# Patient Record
Sex: MALE | Race: WHITE | NOT HISPANIC OR LATINO | URBAN - METROPOLITAN AREA
[De-identification: names, ages, dates, MRNs, and addresses within clinical notes are randomized per-mention and may not be internally consistent; named-entity substitution may affect disease eponyms.]

---

## 2021-08-19 ENCOUNTER — OFFICE VISIT (OUTPATIENT)
Dept: URGENT CARE | Facility: URGENT CARE | Age: 36
End: 2021-08-19
Payer: COMMERCIAL

## 2021-08-19 VITALS
HEART RATE: 89 BPM | DIASTOLIC BLOOD PRESSURE: 81 MMHG | OXYGEN SATURATION: 99 % | WEIGHT: 224 LBS | SYSTOLIC BLOOD PRESSURE: 128 MMHG | TEMPERATURE: 98.8 F

## 2021-08-19 DIAGNOSIS — N34.2 URETHRITIS: ICD-10-CM

## 2021-08-19 DIAGNOSIS — R31.9 HEMATURIA, UNSPECIFIED TYPE: Primary | ICD-10-CM

## 2021-08-19 LAB
ALBUMIN UR-MCNC: 30 MG/DL
APPEARANCE UR: ABNORMAL
BACTERIA #/AREA URNS HPF: ABNORMAL /HPF
BILIRUB UR QL STRIP: NEGATIVE
COLOR UR AUTO: YELLOW
GLUCOSE UR STRIP-MCNC: NEGATIVE MG/DL
HGB UR QL STRIP: ABNORMAL
KETONES UR STRIP-MCNC: ABNORMAL MG/DL
LEUKOCYTE ESTERASE UR QL STRIP: ABNORMAL
NITRATE UR QL: NEGATIVE
PH UR STRIP: 6.5 [PH] (ref 5–7)
RBC #/AREA URNS AUTO: ABNORMAL /HPF
SP GR UR STRIP: 1.02 (ref 1–1.03)
UROBILINOGEN UR STRIP-ACNC: 1 E.U./DL
WBC #/AREA URNS AUTO: >100 /HPF
WBC CLUMPS #/AREA URNS HPF: PRESENT /HPF

## 2021-08-19 PROCEDURE — 87088 URINE BACTERIA CULTURE: CPT | Performed by: FAMILY MEDICINE

## 2021-08-19 PROCEDURE — 87086 URINE CULTURE/COLONY COUNT: CPT | Performed by: FAMILY MEDICINE

## 2021-08-19 PROCEDURE — 81001 URINALYSIS AUTO W/SCOPE: CPT | Performed by: FAMILY MEDICINE

## 2021-08-19 PROCEDURE — 99203 OFFICE O/P NEW LOW 30 MIN: CPT | Mod: 25 | Performed by: FAMILY MEDICINE

## 2021-08-19 PROCEDURE — 96372 THER/PROPH/DIAG INJ SC/IM: CPT | Performed by: FAMILY MEDICINE

## 2021-08-19 RX ORDER — AZITHROMYCIN 250 MG/1
250 TABLET, FILM COATED ORAL ONCE
Status: COMPLETED | OUTPATIENT
Start: 2021-08-19 | End: 2021-08-19

## 2021-08-19 RX ADMIN — AZITHROMYCIN 250 MG: 250 TABLET, FILM COATED ORAL at 20:16

## 2021-08-20 NOTE — PROGRESS NOTES
Chief complaint:   Blood in urine     Past couple of days has had some clear urethral discharge  Today some hematuria noted in urination  Concern for bladder infection   Dysuria/ugency/frequency 2 days  Fever chills: None  Nausea vomiting: None  Flank Pain: None    Problem list and histories reviewed & adjusted, as indicated.  Additional history: as documented    Problem list, Medication list, Allergies, and Medical/Social/Surgical histories reviewed in [facility] and updated as appropriate.    ROS:  Constitutional, HEENT, cardiovascular, pulmonary, gi and gu systems are negative, except as otherwise noted.    OBJECTIVE:                                                    /81   Pulse 89   Temp 98.8  F (37.1  C) (Tympanic)   Wt 101.6 kg (224 lb)   SpO2 99%   There is no height or weight on file to calculate BMI.  GENERAL: healthy, alert and no distress  Eyes: anicteric  Awake alert not in any acute cardiorespiratory distress  Psych: pleasant   Neurologic: No gross neurologic deficits  Skin: no obvious lesions or rashes  MS: no gross musculoskeletal defects noted, no edema    Diagnostic Test Results:  Results for orders placed or performed in visit on 08/19/21 (from the past 24 hour(s))   UA Macro with Reflex to Micro and Culture - lab collect    Specimen: Urine, Midstream   Result Value Ref Range    Color Urine Yellow Colorless, Straw, Light Yellow, Yellow    Appearance Urine Cloudy (A) Clear    Glucose Urine Negative Negative mg/dL    Bilirubin Urine Negative Negative    Ketones Urine Trace (A) Negative mg/dL    Specific Gravity Urine 1.020 1.003 - 1.035    Blood Urine Large (A) Negative    pH Urine 6.5 5.0 - 7.0    Protein Albumin Urine 30  (A) Negative mg/dL    Urobilinogen Urine 1.0 0.2, 1.0 E.U./dL    Nitrite Urine Negative Negative    Leukocyte Esterase Urine Large (A) Negative   Urine Microscopic Exam   Result Value Ref Range    Bacteria Urine Moderate (A) None Seen /HPF    RBC Urine  (A) 0-2 /HPF  /HPF    WBC Urine >100 (A) 0-5 /HPF /HPF    WBC Clumps Urine Present (A) None Seen /HPF        ASSESSMENT/PLAN:                                                        ICD-10-CM    1. Hematuria, unspecified type  R31.9 UA Macro with Reflex to Micro and Culture - lab collect     Urine Microscopic Exam     Urine Culture     cefTRIAXone (ROCEPHIN) injection 1 g     azithromycin (ZITHROMAX) tablet 250 mg   2. Urethritis  N34.2 cefTRIAXone (ROCEPHIN) injection 1 g     azithromycin (ZITHROMAX) tablet 250 mg     See AVS  Urethritis - patient initially declined STD testing. He and his partner regularly do STD screening.  We discussed that I suspect urethritis and cannot rule out coexisting uti as well.  Given Rocephin may treat both, empiric treatment  Done to cover both UTI and gonoccocal urethritis with one dose Rocephin 1 gram IM  zithromax to cover chlamydia as well.  Will send urine for urine culture.  He is declining STD testing at this time. However he will abstain from sexual intercourse until 7 days after treatment  And also plan to get retested, both him and his partner prior to resuming intercourse.  We discussed etiologies of hematuria  Patient with hematuria advised a repeat urinalysis in 6 weeks is needed to make sure hematuria is resolved. If persistent aware will need further evaluation to rule out possibility of stones or tumors. Patient states he will schedule an appointment with a primary care provider to discuss further and declined urology referral at this time.   Aware to go to ER or come in immediately if with any fever chills nausea vomiting or flank pain.  Adverse reactions of medications discussed.  Over the counter medications discussed.   Aware to come back in if with worsening symptoms or if no relief despite treatment plan  Patient voiced understanding and had no further questions.     MD Caroline Nina MD  [facility] [place]

## 2021-08-20 NOTE — PATIENT INSTRUCTIONS
Patient Education     Blood in the Urine    Blood in the urine (hematuria) has many possible causes. If it occurs after an injury (such as a car accident or fall), it is most often a sign of bruising to the kidney or bladder. Common causes of blood in the urine include urinary tract infections, kidney stones, inflammation, tumors, or certain other diseases of the kidney or bladder. Menstruation can cause blood to appear in the urine sample, but it is not coming from the urinary tract.   If only a tiny (trace) amount of blood is present, it will show up on the urine test, even though the urine may be yellow and not pink or red. This may occur with any of the above conditions, as well as heavy exercise or high fever. In this case, your healthcare provider may want to repeat the urine test on another day. This will show if there is still blood in the urine. If there is, then other tests can be done to find out the cause.   Home care  Follow these home care guidelines:    If your urine does not look bloody (pink, brown, or red) then you don't need to restrict your activity in any way.    If you can see blood in your urine, rest and don't do any strenuous activity until your next exam. Don't use aspirin, blood thinners, or anti-platelet or anti-inflammatory medicines. These include ibuprofen and naproxen. These thin the blood and may increase bleeding. Call your healthcare provider to talk about using these medicines.  Follow-up care  Follow up with your healthcare provider, or as advised. If you were injured and had blood in your urine, you should have a repeat urine test in 1 to 2 days. Contact your provider for this test.   A radiologist will review any X-rays that were taken. You will be told of any new findings that may affect your care.   When to seek medical advice  Call your healthcare provider right away if any of these occur:    Bright red blood or blood clots in the urine (if you did not have this  before)    Weakness, dizziness or fainting    New groin, belly, or back pain    Fever of 100.4 F (38 C) or higher, or as directed by your provider    Repeated vomiting    Bleeding from the nose or gums or easy bruising  [employer] last reviewed this educational content on 9/1/2019 2000-2021 [employer]. All rights reserved. This information is not intended as a substitute for professional medical care. Always follow your healthcare professional's instructions.           Patient Education     Urethritis in Men  The urethra is the tube that runs from the bladder through the penis. When the urethra is inflamed, it is called urethritis. The urethra becomes swollen and causes burning pain when you urinate. Other symptoms of urethritis may include itching or tingling of the penis, or pus discharge from the penis. You may also have pain with sex and masturbation. Some men may not have symptoms.      An inflamed urethra can cause pain during urination.   What causes urethritis?  Urethritis can be caused by a bacterial or viral infection. Such an infection can lead to conditions such as a urinary tract infection (UTI) or sexually transmitted infections (STIs). Urethritis can also be caused by injury or sensitivity or allergy to chemicals in lotions and other products.   How is urethritis diagnosed?  Your healthcare provider will examine you and ask about your symptoms and health history. You may also have one or more of the following tests:     Urine test. This is done to take urine samples and have them checked for problems.    Blood test. A blood sample is taken and checked for problems.    Urethral discharge culture. A fluid sample is taken from inside the urethra and checked under a microscope. To get the sample, a cotton swab is inserted into the opening of the penis and into the urethra.    Cystoscopy. This test lets the healthcare provider look for problems in the urinary tract. The test uses a thin,  flexible tube (cystoscope). It has a light and camera attached. The scope is inserted into the urethra.  How is urethritis treated?  Treatment depends on the cause of urethritis. If it s from a bacterial infection, medicines that fight infection (antibiotics) will be given. Your healthcare provider can tell you more about your treatment options. In the meantime, your symptoms can be treated. To ease pain and swelling, anti-inflammatory medicines, such as ibuprofen, may be given. If not treated, symptoms may get worse. Also, scar tissue can form in the urethra, causing it to narrow.   When to call your healthcare provider   Call your healthcare provider right away if you have any of the following:     Fever of  100.4  F ( 38 C ) or higher, or as directed by your provider    Blood in the urine or semen    Burning pain with urination    Increased urge to urinate    Discharge from the penis    Itching, soreness, or swelling in the penis or groin    Pain during sex or masturbation    Inability to urinate  Preventing STIs  When it comes to sex, it s important to take care and be safe. Any sexual contact with the penis, vagina, anus, or mouth can spread an STI. The only sure way to prevent STIs is not having sex. But there are ways to make sex safer. Use a latex condom each time you have sex. And talk with your partner about STIs before you have sex.   [employer] last reviewed this educational content on 2/1/2020 2000-2021 [employer]. All rights reserved. This information is not intended as a substitute for professional medical care. Always follow your healthcare professional's instructions.           Patient Education     Urinary Tract Infections in Men    Urinary tract infections (UTIs) are most often caused by bacteria that invade the urinary tract. The bacteria may come from outside the body. Or they may travel from the skin outside of the rectum into the urethra. Pain in or around the urinary tract is  a common symptom for most UTIs. But the only way to know for sure if you have a UTI is to have a urinalysis and urine culture.   Types of UTIs    Cystitis. This is a bladder infection. It is often linked to a blockage from an enlarged prostate. You may have an urgent or frequent need to urinate, and bloody urine. Treatment includes antibiotics and medicine to relax or shrink the prostate. Sometimes you will need surgery.    Urethritis. This is an infection of the urethra. You may have a discharge from the urethra or burning when you urinate. You may also have pain in the urethra or penis. It is treated with antibiotics.    Prostatitis. This is an inflammation or infection of the prostate. You may have an urgent or frequent need to urinate, fever, or burning when you urinate. Or you may have a tender prostate, or a vague feeling of pressure. Prostatitis is treated with a range of medicines, depending on the cause.    Pyelonephritis. This is a kidney infection. If not treated, it can be serious and damage your kidneys. In severe cases you may need to stay in the hospital. You may have a fever and lower back pain.  Medicines to treat a UTI  Most UTIs are treated with antibiotics. These kill the bacteria. The length of time you need to take them depends on the type of infection. Take antibiotics exactly as directed until all of the medicine is gone. If you don't, the infection may not go away and may become harder to treat. For certain types of UTIs, you may be given other medicine to help treat your symptoms.  Lifestyle changes to treat and prevent UTIs  The lifestyle changes below will help get rid of your current infection. They may also help prevent future UTIs.    Drink plenty of fluids such as water, juice, or other caffeine-free drinks. This helps flush bacteria out of your system.    Empty your bladder when you feel the urge to urinate and before going to sleep. Urine that stays in your bladder promotes  infection.    Use condoms during sex. These help prevent UTIs caused by sexually transmitted bacteria.    Keep follow-up appointments with your healthcare provider. He or she can may do tests to make sure the infection has cleared. If needed, more treatment can be started.  Other treatments to prevent UTIs  Most UTIs respond to medicine. But sometimes you will need a procedure or surgery. This can treat an enlarged prostate, or remove a kidney stone or other blockage. Surgery may also treat problems caused by scarring or long-term infections.  Date Last Reviewed: 1/1/2017 2000-2017 [employer]. [address] [place] [id number]. All rights reserved. This information is not intended as a substitute for professional medical care. Always follow your healthcare professional's instructions. This information has been modified by your health care provider with permission from the publisher.

## 2021-08-21 LAB — BACTERIA UR CULT: ABNORMAL

## 2021-08-28 ENCOUNTER — OFFICE VISIT (OUTPATIENT)
Dept: URGENT CARE | Facility: URGENT CARE | Age: 36
End: 2021-08-28
Payer: COMMERCIAL

## 2021-08-28 VITALS
DIASTOLIC BLOOD PRESSURE: 82 MMHG | WEIGHT: 220 LBS | HEART RATE: 78 BPM | TEMPERATURE: 98.9 F | OXYGEN SATURATION: 99 % | SYSTOLIC BLOOD PRESSURE: 117 MMHG

## 2021-08-28 DIAGNOSIS — R10.9 FLANK PAIN: ICD-10-CM

## 2021-08-28 DIAGNOSIS — R36.9 PENILE DISCHARGE: ICD-10-CM

## 2021-08-28 DIAGNOSIS — R30.0 DYSURIA: Primary | ICD-10-CM

## 2021-08-28 DIAGNOSIS — N39.0 URINARY TRACT INFECTION WITHOUT HEMATURIA, SITE UNSPECIFIED: ICD-10-CM

## 2021-08-28 LAB
ALBUMIN SERPL-MCNC: 3.9 G/DL (ref 3.4–5)
ALBUMIN UR-MCNC: 100 MG/DL
ALP SERPL-CCNC: 84 U/L (ref 40–150)
ALT SERPL W P-5'-P-CCNC: 35 U/L (ref 0–70)
ANION GAP SERPL CALCULATED.3IONS-SCNC: 5 MMOL/L (ref 3–14)
APPEARANCE UR: CLEAR
AST SERPL W P-5'-P-CCNC: 23 U/L (ref 0–45)
BACTERIA #/AREA URNS HPF: ABNORMAL /HPF
BASOPHILS # BLD AUTO: 0 10E3/UL (ref 0–0.2)
BASOPHILS NFR BLD AUTO: 0 %
BILIRUB SERPL-MCNC: 1.8 MG/DL (ref 0.2–1.3)
BILIRUB UR QL STRIP: NEGATIVE
BUN SERPL-MCNC: 11 MG/DL (ref 7–30)
CALCIUM SERPL-MCNC: 9.2 MG/DL (ref 8.5–10.1)
CHLORIDE BLD-SCNC: 106 MMOL/L (ref 94–109)
CO2 SERPL-SCNC: 27 MMOL/L (ref 20–32)
COLOR UR AUTO: YELLOW
CREAT SERPL-MCNC: 0.82 MG/DL (ref 0.66–1.25)
EOSINOPHIL # BLD AUTO: 0.3 10E3/UL (ref 0–0.7)
EOSINOPHIL NFR BLD AUTO: 2 %
ERYTHROCYTE [DISTWIDTH] IN BLOOD BY AUTOMATED COUNT: 13.1 % (ref 10–15)
GFR SERPL CREATININE-BSD FRML MDRD: >90 ML/MIN/1.73M2
GLUCOSE BLD-MCNC: 95 MG/DL (ref 70–99)
GLUCOSE UR STRIP-MCNC: NEGATIVE MG/DL
HCT VFR BLD AUTO: 48.7 % (ref 40–53)
HGB BLD-MCNC: 16.3 G/DL (ref 13.3–17.7)
HGB UR QL STRIP: ABNORMAL
KETONES UR STRIP-MCNC: NEGATIVE MG/DL
LEUKOCYTE ESTERASE UR QL STRIP: ABNORMAL
LYMPHOCYTES # BLD AUTO: 2.2 10E3/UL (ref 0.8–5.3)
LYMPHOCYTES NFR BLD AUTO: 16 %
MCH RBC QN AUTO: 29 PG (ref 26.5–33)
MCHC RBC AUTO-ENTMCNC: 33.5 G/DL (ref 31.5–36.5)
MCV RBC AUTO: 87 FL (ref 78–100)
MONOCYTES # BLD AUTO: 1 10E3/UL (ref 0–1.3)
MONOCYTES NFR BLD AUTO: 7 %
NEUTROPHILS # BLD AUTO: 10.2 10E3/UL (ref 1.6–8.3)
NEUTROPHILS NFR BLD AUTO: 75 %
NITRATE UR QL: NEGATIVE
PH UR STRIP: 7.5 [PH] (ref 5–7)
PLATELET # BLD AUTO: 295 10E3/UL (ref 150–450)
POTASSIUM BLD-SCNC: 3.7 MMOL/L (ref 3.4–5.3)
PROT SERPL-MCNC: 7.8 G/DL (ref 6.8–8.8)
RBC # BLD AUTO: 5.62 10E6/UL (ref 4.4–5.9)
RBC #/AREA URNS AUTO: >100 /HPF
SODIUM SERPL-SCNC: 138 MMOL/L (ref 133–144)
SP GR UR STRIP: 1.02 (ref 1–1.03)
SQUAMOUS #/AREA URNS AUTO: ABNORMAL /LPF
TRANS CELLS #/AREA URNS HPF: ABNORMAL /HPF
UROBILINOGEN UR STRIP-ACNC: 1 E.U./DL
WBC # BLD AUTO: 13.7 10E3/UL (ref 4–11)
WBC #/AREA URNS AUTO: ABNORMAL /HPF
WBC CLUMPS #/AREA URNS HPF: PRESENT /HPF

## 2021-08-28 PROCEDURE — 87088 URINE BACTERIA CULTURE: CPT | Performed by: FAMILY MEDICINE

## 2021-08-28 PROCEDURE — 80053 COMPREHEN METABOLIC PANEL: CPT | Performed by: FAMILY MEDICINE

## 2021-08-28 PROCEDURE — 81001 URINALYSIS AUTO W/SCOPE: CPT | Performed by: FAMILY MEDICINE

## 2021-08-28 PROCEDURE — 87491 CHLMYD TRACH DNA AMP PROBE: CPT | Performed by: FAMILY MEDICINE

## 2021-08-28 PROCEDURE — 85025 COMPLETE CBC W/AUTO DIFF WBC: CPT | Performed by: FAMILY MEDICINE

## 2021-08-28 PROCEDURE — 36415 COLL VENOUS BLD VENIPUNCTURE: CPT | Performed by: FAMILY MEDICINE

## 2021-08-28 PROCEDURE — 87086 URINE CULTURE/COLONY COUNT: CPT | Performed by: FAMILY MEDICINE

## 2021-08-28 PROCEDURE — 99214 OFFICE O/P EST MOD 30 MIN: CPT | Performed by: FAMILY MEDICINE

## 2021-08-28 PROCEDURE — 87591 N.GONORRHOEAE DNA AMP PROB: CPT | Performed by: FAMILY MEDICINE

## 2021-08-28 PROCEDURE — 87186 SC STD MICRODIL/AGAR DIL: CPT | Performed by: FAMILY MEDICINE

## 2021-08-28 RX ORDER — SULFAMETHOXAZOLE/TRIMETHOPRIM 800-160 MG
1 TABLET ORAL 2 TIMES DAILY
Qty: 14 TABLET | Refills: 0 | Status: SHIPPED | OUTPATIENT
Start: 2021-08-28 | End: 2021-09-04

## 2021-08-28 ASSESSMENT — PAIN SCALES - GENERAL: PAINLEVEL: SEVERE PAIN (6)

## 2021-08-28 NOTE — PROGRESS NOTES
SUBJECTIVE:  Niall Rossi, a 36 year old male scheduled an appointment to discuss the following issues:     Dysuria  Flank pain  Urinary tract infection without hematuria, site unspecified  Penile discharge    Medical, social, surgical, and family histories reviewed.     UTI   Back Pain    Pt was seen on 8/19/21 for urethritis and was treated with Ceftriaxone and Azithromycin.  He states initially he had bloody urine and discharge at the tip of his penis then.  The penile discharge has resolved, but mild burning on urination and right flank pain has persisted.  Pt is sexually active with same sex partner and uses sex toys.  No recent bleeding.  No hx of kidney or bladder stones.  Last night he felt fever and some back pain.  He is concerned re STD but this was not recently checked.      ROS:  See HPI.  No nausea/vomiting.  No fever/chills.  No chest pain/SOB.  No BM problems.  No dizziness or syncope.      OBJECTIVE:  /82 (BP Location: Right arm, Patient Position: Chair, Cuff Size: Adult Regular)   Pulse 78   Temp 98.9  F (37.2  C) (Tympanic)   Wt 99.8 kg (220 lb)   SpO2 99%   EXAM:  GENERAL APPEARANCE: alert and mild distress; afebrile, not septic  EYES: Eyes grossly normal to inspection, PERRL and conjunctivae and sclerae normal  HENT: ear canals and TM's normal and nose and mouth without ulcers or lesions  NECK: no adenopathy, no asymmetry, masses, or scars and thyroid normal to palpation  RESP: lungs clear to auscultation - no rales, rhonchi or wheezes  CV: regular rates and rhythm, normal S1 S2, no S3 or S4 and no murmur, click or rub  LYMPHATICS: no cervical adenopathy  ABDOMEN: soft, nontender, without hepatosplenomegaly or masses and bowel sounds normal; right CVA tenderness  MS: extremities normal- no gross deformities noted  SKIN: no suspicious lesions or rashes  NEURO: Normal strength and tone, mentation intact and speech normal      ASSESSMENT/PLAN:  (R30.0) Dysuria  (primary encounter  diagnosis)  Comment: CBC+ diff showed mild leukocytosis with WBC 13.7, CMP normal (with normal creatinine) except for mildly elevated bilirubin; urinalysis showed evidence of significant WBC & RBC per HPF and bacteruria suggestive of infection  Plan: CBC with platelets and differential,         Comprehensive metabolic panel (BMP + Alb, Alk         Phos, ALT, AST, Total. Bili, TP), UA Macro with        Reflex to Micro and Culture - lab collect,         Urine Microscopic, Urine Culture         (R10.9) Flank pain  Comment: Pyelonephritis/UTI; see above  Plan: CBC with platelets and differential,         Comprehensive metabolic panel (BMP + Alb, Alk         Phos, ALT, AST, Total. Bili, TP)          (N39.0) Urinary tract infection without hematuria, site unspecified  Plan: sulfamethoxazole-trimethoprim (BACTRIM DS)         800-160 MG tablet          (R36.9) Penile discharge  Comment:  At onset of symptoms  Plan: NEISSERIA GONORRHOEA PCR, CHLAMYDIA TRACHOMATIS        PCR     Care instructions given.  Safe sex practice advised  Pt to f/up PCP within 5 days, sooner if no improvement or worsening.  Warning signs and symptoms explained.

## 2021-08-29 LAB
C TRACH DNA SPEC QL NAA+PROBE: NEGATIVE
N GONORRHOEA DNA SPEC QL NAA+PROBE: NEGATIVE

## 2021-09-01 LAB
BACTERIA UR CULT: ABNORMAL
BACTERIA UR CULT: ABNORMAL

## 2021-10-17 ENCOUNTER — HEALTH MAINTENANCE LETTER (OUTPATIENT)
Age: 36
End: 2021-10-17

## 2022-10-03 ENCOUNTER — HEALTH MAINTENANCE LETTER (OUTPATIENT)
Age: 37
End: 2022-10-03

## 2023-02-11 ENCOUNTER — HEALTH MAINTENANCE LETTER (OUTPATIENT)
Age: 38
End: 2023-02-11

## 2024-03-09 ENCOUNTER — HEALTH MAINTENANCE LETTER (OUTPATIENT)
Age: 39
End: 2024-03-09